# Patient Record
Sex: FEMALE | Race: WHITE | Employment: FULL TIME | URBAN - METROPOLITAN AREA
[De-identification: names, ages, dates, MRNs, and addresses within clinical notes are randomized per-mention and may not be internally consistent; named-entity substitution may affect disease eponyms.]

---

## 2017-10-10 ENCOUNTER — HOSPITAL ENCOUNTER (OUTPATIENT)
Dept: MAMMOGRAPHY | Age: 62
Discharge: HOME | End: 2017-10-10
Attending: OBSTETRICS & GYNECOLOGY
Payer: COMMERCIAL

## 2017-10-10 DIAGNOSIS — Z12.31 VISIT FOR SCREENING MAMMOGRAM: ICD-10-CM

## 2017-10-10 DIAGNOSIS — Z78.0 MENOPAUSE: ICD-10-CM

## 2017-10-10 PROCEDURE — 77067 SCR MAMMO BI INCL CAD: CPT

## 2017-10-10 PROCEDURE — 77080 DXA BONE DENSITY AXIAL: CPT

## 2018-10-15 ENCOUNTER — HOSPITAL ENCOUNTER (OUTPATIENT)
Dept: MAMMOGRAPHY | Age: 63
Discharge: HOME | End: 2018-10-15
Attending: OBSTETRICS & GYNECOLOGY
Payer: COMMERCIAL

## 2018-10-15 DIAGNOSIS — Z12.39 ENCOUNTER FOR SCREENING BREAST EXAMINATION: ICD-10-CM

## 2018-10-15 PROCEDURE — 77067 SCR MAMMO BI INCL CAD: CPT

## 2019-11-25 ENCOUNTER — HOSPITAL ENCOUNTER (OUTPATIENT)
Dept: MAMMOGRAPHY | Age: 64
Discharge: HOME | End: 2019-11-25
Attending: OBSTETRICS & GYNECOLOGY
Payer: COMMERCIAL

## 2019-11-25 DIAGNOSIS — Z12.31 VISIT FOR SCREENING MAMMOGRAM: ICD-10-CM

## 2019-11-25 PROCEDURE — 77067 SCR MAMMO BI INCL CAD: CPT

## 2021-01-08 ENCOUNTER — TRANSCRIBE ORDER (OUTPATIENT)
Dept: SCHEDULING | Age: 66
End: 2021-01-08

## 2021-01-08 DIAGNOSIS — Z12.31 SCREENING MAMMOGRAM FOR HIGH-RISK PATIENT: Primary | ICD-10-CM

## 2021-01-22 ENCOUNTER — HOSPITAL ENCOUNTER (OUTPATIENT)
Dept: MAMMOGRAPHY | Age: 66
Discharge: HOME | End: 2021-01-22
Attending: OBSTETRICS & GYNECOLOGY
Payer: COMMERCIAL

## 2021-01-22 DIAGNOSIS — Z12.31 SCREENING MAMMOGRAM FOR HIGH-RISK PATIENT: ICD-10-CM

## 2021-01-22 PROCEDURE — 77067 SCR MAMMO BI INCL CAD: CPT

## 2022-01-27 ENCOUNTER — TRANSCRIBE ORDER (OUTPATIENT)
Dept: SCHEDULING | Age: 67
End: 2022-01-27

## 2022-01-27 DIAGNOSIS — Z78.0 ASYMPTOMATIC MENOPAUSAL STATE: Primary | ICD-10-CM

## 2022-02-09 ENCOUNTER — TRANSCRIBE ORDER (OUTPATIENT)
Dept: SCHEDULING | Age: 67
End: 2022-02-09

## 2022-02-09 DIAGNOSIS — Z12.31 VISIT FOR SCREENING MAMMOGRAM: Primary | ICD-10-CM

## 2022-02-18 ENCOUNTER — HOSPITAL ENCOUNTER (OUTPATIENT)
Dept: MAMMOGRAPHY | Age: 67
Discharge: HOME | End: 2022-02-18
Attending: OBSTETRICS & GYNECOLOGY
Payer: COMMERCIAL

## 2022-02-18 DIAGNOSIS — Z78.0 ASYMPTOMATIC MENOPAUSAL STATE: ICD-10-CM

## 2022-02-18 DIAGNOSIS — Z12.31 VISIT FOR SCREENING MAMMOGRAM: ICD-10-CM

## 2022-02-18 PROCEDURE — 77080 DXA BONE DENSITY AXIAL: CPT

## 2022-02-18 PROCEDURE — 77067 SCR MAMMO BI INCL CAD: CPT

## 2022-03-01 ENCOUNTER — HOSPITAL ENCOUNTER (OUTPATIENT)
Dept: MAMMOGRAPHY | Age: 67
Discharge: HOME | End: 2022-03-01
Attending: OBSTETRICS & GYNECOLOGY
Payer: COMMERCIAL

## 2022-03-01 DIAGNOSIS — R92.8 ABNORMAL MAMMOGRAM: ICD-10-CM

## 2022-03-01 PROCEDURE — 76642 ULTRASOUND BREAST LIMITED: CPT

## 2022-03-01 PROCEDURE — 77061 BREAST TOMOSYNTHESIS UNI: CPT

## 2022-03-01 NOTE — PROGRESS NOTES
Results of mammogram were called to Dr Edy Dang office. I spoke with His office to leave message for nurse to return our call. Patient will schedule biopsy here. They will fax order.

## 2022-03-08 ENCOUNTER — HOSPITAL ENCOUNTER (OUTPATIENT)
Dept: MAMMOGRAPHY | Age: 67
Discharge: HOME | End: 2022-03-08
Attending: OBSTETRICS & GYNECOLOGY
Payer: COMMERCIAL

## 2022-03-08 DIAGNOSIS — R92.8 ABNORMAL MAMMOGRAM: ICD-10-CM

## 2022-03-08 PROCEDURE — 74011000250 HC RX REV CODE- 250: Performed by: RADIOLOGY

## 2022-03-08 PROCEDURE — 88305 TISSUE EXAM BY PATHOLOGIST: CPT

## 2022-03-08 PROCEDURE — 19083 BX BREAST 1ST LESION US IMAG: CPT

## 2022-03-08 PROCEDURE — 88341 IMHCHEM/IMCYTCHM EA ADD ANTB: CPT

## 2022-03-08 PROCEDURE — 77065 DX MAMMO INCL CAD UNI: CPT

## 2022-03-08 PROCEDURE — 88360 TUMOR IMMUNOHISTOCHEM/MANUAL: CPT

## 2022-03-08 PROCEDURE — 88342 IMHCHEM/IMCYTCHM 1ST ANTB: CPT

## 2022-03-08 RX ORDER — LIDOCAINE HYDROCHLORIDE 10 MG/ML
5 INJECTION INFILTRATION; PERINEURAL
Status: COMPLETED | OUTPATIENT
Start: 2022-03-08 | End: 2022-03-08

## 2022-03-08 RX ORDER — LIDOCAINE HYDROCHLORIDE AND EPINEPHRINE 10; 10 MG/ML; UG/ML
10 INJECTION, SOLUTION INFILTRATION; PERINEURAL ONCE
Status: COMPLETED | OUTPATIENT
Start: 2022-03-08 | End: 2022-03-08

## 2022-03-08 RX ADMIN — LIDOCAINE HYDROCHLORIDE,EPINEPHRINE BITARTRATE 100 MG: 10; .01 INJECTION, SOLUTION INFILTRATION; PERINEURAL at 10:15

## 2022-03-08 RX ADMIN — LIDOCAINE HYDROCHLORIDE 5 ML: 10 INJECTION, SOLUTION INFILTRATION; PERINEURAL at 10:15

## 2022-03-14 NOTE — PROGRESS NOTES
Pathology approved by Dr. Cira Apple. Called patient and explained that it does not appear to be cancer but that some atypical cells were found. Advised patient that because of this, the recommendation is that she meet with a breast surgeon to discuss next steps. Appointment made with Dr. Sindhu Escobar 3/21 at 1400, patient provided with appointment information. She reports that her biopsy site is healing well with no concerns. Encouraged her to call with any question or concerns.

## 2022-03-17 ENCOUNTER — DOCUMENTATION ONLY (OUTPATIENT)
Dept: SURGERY | Age: 67
End: 2022-03-17

## 2022-03-17 NOTE — PROGRESS NOTES
Patient called to cancel her appointment. Jacki Marshall Received a request from [facility] for medical records. Faxed back stating that patient has cancel her appointment.  Never seen our doctors before